# Patient Record
Sex: FEMALE | Race: BLACK OR AFRICAN AMERICAN | NOT HISPANIC OR LATINO | Employment: STUDENT | URBAN - METROPOLITAN AREA
[De-identification: names, ages, dates, MRNs, and addresses within clinical notes are randomized per-mention and may not be internally consistent; named-entity substitution may affect disease eponyms.]

---

## 2017-01-16 ENCOUNTER — PATIENT MESSAGE (OUTPATIENT)
Dept: ENDOCRINOLOGY | Facility: CLINIC | Age: 22
End: 2017-01-16

## 2017-01-16 ENCOUNTER — OFFICE VISIT (OUTPATIENT)
Dept: ENDOCRINOLOGY | Facility: CLINIC | Age: 22
End: 2017-01-16
Payer: COMMERCIAL

## 2017-01-16 VITALS
HEIGHT: 67 IN | HEART RATE: 62 BPM | DIASTOLIC BLOOD PRESSURE: 70 MMHG | BODY MASS INDEX: 22.25 KG/M2 | WEIGHT: 141.75 LBS | SYSTOLIC BLOOD PRESSURE: 110 MMHG

## 2017-01-16 DIAGNOSIS — K59.00 CONSTIPATION, UNSPECIFIED CONSTIPATION TYPE: ICD-10-CM

## 2017-01-16 DIAGNOSIS — E55.9 VITAMIN D DEFICIENCY: ICD-10-CM

## 2017-01-16 DIAGNOSIS — R53.83 FATIGUE, UNSPECIFIED TYPE: Primary | ICD-10-CM

## 2017-01-16 PROCEDURE — 1159F MED LIST DOCD IN RCRD: CPT | Mod: S$GLB,,, | Performed by: INTERNAL MEDICINE

## 2017-01-16 PROCEDURE — 99999 PR PBB SHADOW E&M-EST. PATIENT-LVL III: CPT | Mod: PBBFAC,,, | Performed by: INTERNAL MEDICINE

## 2017-01-16 PROCEDURE — 99204 OFFICE O/P NEW MOD 45 MIN: CPT | Mod: S$GLB,,, | Performed by: INTERNAL MEDICINE

## 2017-01-16 NOTE — PROGRESS NOTES
Subjective:      Patient ID: Charissa Lawson is a 21 y.o. female.    Chief Complaint:  Thyroid Problem      History of Present Illness  Ms. Lawson presents for hormonal evaluation.    Has no significant medical history. Taking no regular medication at this time. Stopped OCP's in 12/2016.    Denies family history of thyroid disease. Has family history of PCOS.  Notes having acne and oily skin. Also has some mild coarse hair on the chin and chest that she plucks. No excessive hair loss or frontal balding.   Menstrual cycles have mostly been regular both on and off of OCP's.     Does endorse increased fatigue. Sleep patterns have been poor. Wakes up frequently at night and unable to go back to sleep.  Has some cold intolerance and constipation. Also notes increased anxiousness and a 10 lb wt loss since 11/2016.    Has bloating and worsening constipation after she eats certain foods.     Review of Systems   Constitutional: Positive for unexpected weight change.   HENT: Negative for voice change.    Eyes: Negative for visual disturbance.   Respiratory: Negative for shortness of breath.    Cardiovascular: Negative for chest pain.   Gastrointestinal: Negative for abdominal pain.   Endocrine: Positive for cold intolerance.   Genitourinary: Negative for frequency.   Musculoskeletal: Negative for myalgias.   Skin: Negative for rash.   Neurological: Positive for headaches.       Objective:   Physical Exam   Constitutional: She is oriented to person, place, and time. She appears well-developed and well-nourished.   HENT:   Head: Normocephalic and atraumatic.   Right Ear: External ear normal.   Left Ear: External ear normal.   Nose: Nose normal.   Neck: No tracheal deviation present. No thyromegaly present.   Cardiovascular: Normal rate, regular rhythm and normal heart sounds.    No edema   Pulmonary/Chest: Effort normal and breath sounds normal.   Abdominal: Soft. Bowel sounds are normal. There is no tenderness.    Musculoskeletal:   Normal gait, no cyanosis or clubbing   Neurological: She is alert and oriented to person, place, and time. She has normal reflexes.   Vibration sense intact   Skin: Skin is warm and dry. No rash noted.   No nodules, no ulcers   Psychiatric: She has a normal mood and affect. Judgment normal.   Vitals reviewed.      Lab Review:   Results for TITO MAYFIELD (MRN [id number]) as of 1/16/2017 12:25   Ref. Range 1/16/2017 09:32   WBC Latest Ref Range: 3.90 - 12.70 K/uL 4.32   RBC Latest Ref Range: 4.00 - 5.40 M/uL 4.29   Hemoglobin Latest Ref Range: 12.0 - 16.0 g/dL 12.5   Hematocrit Latest Ref Range: 37.0 - 48.5 % 37.3   MCV Latest Ref Range: 82 - 98 fL 87   MCH Latest Ref Range: 27.0 - 31.0 pg 29.1   MCHC Latest Ref Range: 32.0 - 36.0 % 33.5   RDW Latest Ref Range: 11.5 - 14.5 % 13.4   Platelets Latest Ref Range: 150 - 350 K/uL 293   MPV Latest Ref Range: 9.2 - 12.9 fL 9.1 (L)   Gran% Latest Ref Range: 38.0 - 73.0 % 36.7 (L)   Gran # Latest Ref Range: 1.8 - 7.7 K/uL 1.6 (L)   Lymph% Latest Ref Range: 18.0 - 48.0 % 52.5 (H)   Lymph # Latest Ref Range: 1.0 - 4.8 K/uL 2.3   Mono% Latest Ref Range: 4.0 - 15.0 % 7.6   Mono # Latest Ref Range: 0.3 - 1.0 K/uL 0.3   Eosinophil% Latest Ref Range: 0.0 - 8.0 % 2.5   Eos # Latest Ref Range: 0.0 - 0.5 K/uL 0.1   Basophil% Latest Ref Range: 0.0 - 1.9 % 0.7   Baso # Latest Ref Range: 0.00 - 0.20 K/uL 0.03     Results for TITO MAYFIELD (MRN [id number]) as of 1/16/2017 12:25   Ref. Range 1/16/2017 09:32   Vit D, 25-Hydroxy Latest Ref Range: 30 - 96 ng/mL 15 (L)   TSH Latest Ref Range: 0.400 - 4.000 uIU/mL 0.945   Estradiol Latest Ref Range: See Text pg/mL 174   FSH Latest Ref Range: See Text mIU/mL 3.30   LH Latest Ref Range: See Text mIU/mL 7.0   Testosterone, Total Latest Ref Range: 5.0 - 73.0 ng/dL 37       Assessment:     1. Fatigue, unspecified type    2. Constipation, unspecified constipation type    3. Vitamin D deficiency        Plan:     --Patient  with non-specific symptoms  --Symptoms do not appear to be from thyroid dysfunction as her TSH level is in the normal range  --TPO antibodies checked and pending (if positive would indicate need for annual TSH check or sooner if symptoms arise)  --Unlikely PCOS as she doesn't have biochemical evidence of hyperandrogenism and cycles are regular  --Unlikely that mild hirsutism and acne are due to PCOS or androgen excess  --LH/FSH and estradiol levels also WNL  --Given GI symptoms and Vit D deficiency will screen for Celiacs disease  --TTG pending  --Has evidence of Vit D deficiency, so recommend she start cholecalciferol 2000 units OTC once daily    Jasbir Saeed M.D. Staff Endocrinology

## 2017-01-16 NOTE — MR AVS SNAPSHOT
[facility] - Endo/Diab/Metab  [address]  [facility] [address]  Phone: [phone]  Fax: [phone]                  Charissa A Renny   2017 8:00 AM   Office Visit    Description:  Female : 1995   Provider:  Jasbir Saeed MD   Department:  [facility] - Endo/Diab/Metab           Reason for Visit     Thyroid Problem           Diagnoses this Visit        Comments    Fatigue, unspecified type    -  Primary     Constipation, unspecified constipation type                To Do List           Future Appointments        Provider Department Dept Phone    2017 9:10 AM LAB, APPOINTMENT [place] [facility] [phone]    2017 10:00 AM Carlos Perez MD [facility] Neurology [phone]      Goals (5 Years of Data)     None      [facility] On [facility]     [facility] Nurse Care Line -  Assistance  Registered nurses in the [facility] provide clinical advisement, health education, appointment booking, and other advisory services.  Call for this free service at [phone].             Medications           Message regarding Medications     Verify the changes and/or additions to your medication regime listed below are the same as discussed with your clinician today.  If any of these changes or additions are incorrect, please notify your healthcare provider.             Verify that the below list of medications is an accurate representation of the medications you are currently taking.  If none reported, the list may be blank. If incorrect, please contact your healthcare provider. Carry this list with you in case of emergency.           Current Medications     ATRALIN 0.05 % gel     norethindrone-ethinyl estradiol (LOESTRIN FE , 28-DAY,) 1 mg-20 mcg (21)/75 mg (7) per tablet Take 1 tablet by mouth once daily.           Clinical Reference Information           Vital Signs - Last Recorded  Most recent update: 2017  8:08 AM by Courtney ALARCON  "ELLIOT Mahoney    BP Pulse Ht Wt LMP BMI    110/70 (45 %/ 69 %)* 62 5' 7" (1.702 m) (86 %, Z= 1.06) 64.3 kg (141 lb 12.1 oz) (72 %, Z= 0.58) 01/08/2017 22.2 kg/m2 (56 %, Z= 0.16)    *BP percentiles are based on NHBPEP's 4th Report    Growth percentiles are based on CDC 2-20 Years data.      Blood Pressure          Most Recent Value    BP  110/70      Allergies as of 1/16/2017     No Known Allergies      Immunizations Administered on Date of Encounter - 1/16/2017     None      Orders Placed During Today's Visit     Future Labs/Procedures Expected by Expires    CBC auto differential  1/16/2017 3/17/2018    Estradiol  1/16/2017 3/17/2018    Follicle stimulating hormone  1/16/2017 3/17/2018    Luteinizing hormone  1/16/2017 3/17/2018    Testosterone  1/16/2017 3/17/2018    THYROID PEROXIDASE ANTIBODY  1/16/2017 3/17/2018    TISSUE TRANSGLUTAMINASE, IGA  1/16/2017 3/17/2018    TSH  1/16/2017 3/17/2018    Vitamin D  1/16/2017 3/17/2018      "

## 2017-02-08 ENCOUNTER — OFFICE VISIT (OUTPATIENT)
Dept: INTERNAL MEDICINE | Facility: CLINIC | Age: 22
End: 2017-02-08
Payer: COMMERCIAL

## 2017-02-08 VITALS
DIASTOLIC BLOOD PRESSURE: 80 MMHG | OXYGEN SATURATION: 99 % | WEIGHT: 143.75 LBS | HEART RATE: 104 BPM | BODY MASS INDEX: 22.56 KG/M2 | HEIGHT: 67 IN | SYSTOLIC BLOOD PRESSURE: 120 MMHG

## 2017-02-08 DIAGNOSIS — Z00.00 HEALTH CARE MAINTENANCE: Primary | ICD-10-CM

## 2017-02-08 DIAGNOSIS — F32.A ANXIETY AND DEPRESSION: ICD-10-CM

## 2017-02-08 DIAGNOSIS — R19.6 BREATH ODOR: ICD-10-CM

## 2017-02-08 DIAGNOSIS — Z23 NEED FOR INFLUENZA VACCINATION: ICD-10-CM

## 2017-02-08 DIAGNOSIS — F41.9 ANXIETY AND DEPRESSION: ICD-10-CM

## 2017-02-08 DIAGNOSIS — Z23 NEED FOR TDAP VACCINATION: ICD-10-CM

## 2017-02-08 PROCEDURE — 99999 PR PBB SHADOW E&M-EST. PATIENT-LVL III: CPT | Mod: PBBFAC,,, | Performed by: INTERNAL MEDICINE

## 2017-02-08 PROCEDURE — 99385 PREV VISIT NEW AGE 18-39: CPT | Mod: S$GLB,,, | Performed by: INTERNAL MEDICINE

## 2017-02-08 RX ORDER — METRONIDAZOLE 500 MG/1
TABLET ORAL
COMMUNITY
Start: 2016-12-26

## 2017-02-08 RX ORDER — ESCITALOPRAM OXALATE 10 MG/1
10 TABLET ORAL DAILY
Qty: 30 TABLET | Refills: 3 | Status: SHIPPED | OUTPATIENT
Start: 2017-02-08 | End: 2017-03-10

## 2017-02-08 RX ORDER — ESCITALOPRAM OXALATE 10 MG/1
10 TABLET ORAL DAILY
Qty: 30 TABLET | Refills: 3 | Status: SHIPPED | OUTPATIENT
Start: 2017-02-08 | End: 2017-02-08 | Stop reason: SDUPTHER

## 2017-02-08 NOTE — PROGRESS NOTES
"Subjective:       Patient ID: Charissa Lawson is a 21 y.o. female.    Chief Complaint: Establish Care    HPI   20 yo F here to establish care and have yearly preventative healthcare visit.     She reports that she has a foul odor from her breath. Not from vagina nor armpits.   Going on x 4 years.  She reports that her parents don't smell it but that others sometimes do. When I ask her about others smelling the odor she reports that people will not say something to her but put their hands over their noses. She smells a fishy odor. She reports often after getting out of the shower.   She is worried about TMAU. Trimethylaminuria. She has read on the internet that this can lead to fish odor.   She reports good hygiene.     She saw endocrine on 1/16/17 for fatigue. TFT normal, unlikely PCOS, LH/FSH, estardiol nl. Screen for celiac w TTD was normal. Start vit d 2000 IU OTC.     Pt message:  "Urine testing for a disorder called Trimethylaminuria   (TMAU). This test is only done in a few places in the    and will most likely have to be shipped to [facility]."    She has hx gonorrhea treated 12/15/16.   Dental eval has been okay.     Review of Systems   Constitutional: Negative for fever.   HENT: Negative.         Reports foul breath and body odor   Eyes: Negative.    Respiratory: Negative for shortness of breath.    Cardiovascular: Negative for chest pain and leg swelling.   Gastrointestinal: Negative for abdominal pain, diarrhea, nausea and vomiting.   Genitourinary: Negative.    Musculoskeletal: Negative for arthralgias.   Skin: Negative for rash.   Psychiatric/Behavioral: The patient is nervous/anxious.         Frequently feels anxious, feels judged by others       Objective:     Visit Vitals    /80    Pulse 104    Ht 5' 7" (1.702 m)    Wt 65.2 kg (143 lb 11.8 oz)    LMP 01/08/2017    SpO2 99%    BMI 22.51 kg/m2        Physical Exam   Constitutional: She is oriented to person, place, and " time. She appears well-developed and well-nourished.   I do not detect a foul breath or body odor.    HENT:   Head: Normocephalic and atraumatic.   Eyes: Conjunctivae and EOM are normal. Pupils are equal, round, and reactive to light.   Neck: Neck supple. No thyromegaly present.   Cardiovascular: Normal rate, regular rhythm and normal heart sounds.    No murmur heard.  Pulmonary/Chest: Effort normal and breath sounds normal. No respiratory distress. She has no wheezes.   Abdominal: Soft. Bowel sounds are normal. She exhibits no distension. There is no tenderness.   Musculoskeletal: Normal range of motion.   Neurological: She is alert and oriented to person, place, and time.   Skin: Skin is warm and dry. No rash noted.   Psychiatric: She has a normal mood and affect. Judgment and thought content normal.   Vitals reviewed.      Assessment:       1. Health care maintenance    2. Breath odor    3. Anxiety and depression    4. Need for Tdap vaccination    5. Need for influenza vaccination        Plan:       Charissa KHOURY was seen today for establish care.    Diagnoses and all orders for this visit:    Health care maintenance  Patient given written rx to receive tdap and flu vaccine at pharmacy    Breath odor - per pt report. Pt is insistent that she wants to be tested for TMAU. Apart from subjective fishy odor I do not recognize any other symptoms of IEM on her history or physical I suspect that her concern over her breath/body odor may stem from anxiety rather than an organic issue especially as her parents notice no odor and I do not detect any currently. She would like further evaluation so I will refer her to genetics for evaluation for possible testing for TMAU.  -     Ambulatory consult to Genetics    Anxiety and depression  -   start  escitalopram oxalate (LEXAPRO) 10 MG tablet; Take 1 tablet (10 mg total) by mouth once daily.    Need for Tdap vaccination  Need for influenza vaccination

## 2017-02-08 NOTE — MR AVS SNAPSHOT
[facility] - Internal Medicine  [address]  [place] [address]  Phone: [phone]  Fax: [phone]                  Charissa Lawson   2017 2:00 PM   Office Visit    Description:  Female : 1995   Provider:  Surekha Duffy MD   Department:  [facility] - Internal Medicine           Reason for Visit     Establish Care           Diagnoses this Visit        Comments    Health care maintenance    -  Primary     Breath odor         Anxiety and depression         Need for Tdap vaccination         Need for influenza vaccination                To Do List           Future Appointments        Provider Department Dept Phone    2017 8:00 AM Surekha Duffy MD [facility] Internal Medicine [phone]      Goals (5 Years of Data)     None      Follow-Up and Disposition     Return in about 2 months (around 2017).       These Medications        Disp Refills Start End    escitalopram oxalate (LEXAPRO) 10 MG tablet 30 tablet 3 2017 3/10/2017    Take 1 tablet (10 mg total) by mouth once daily. - Oral    [facility] [address] [place] [address] AT [facility] #: [phone]         [facility] On Call     [facility] On Call Nurse Care Line -  Assistance  Registered nurses in the [facility] provide clinical advisement, health education, appointment booking, and other advisory services.  Call for this free service at [phone].             Medications           Message regarding Medications     Verify the changes and/or additions to your medication regime listed below are the same as discussed with your clinician today.  If any of these changes or additions are incorrect, please notify your healthcare provider.        START taking these NEW medications        Refills    escitalopram oxalate (LEXAPRO) 10 MG tablet 3    Sig: Take 1 tablet (10 mg total) by mouth once daily.    Class: Normal    Route: Oral  "          Verify that the below list of medications is an accurate representation of the medications you are currently taking.  If none reported, the list may be blank. If incorrect, please contact your healthcare provider. Carry this list with you in case of emergency.           Current Medications     ATRALIN 0.05 % gel     escitalopram oxalate (LEXAPRO) 10 MG tablet Take 1 tablet (10 mg total) by mouth once daily.    metronidazole (FLAGYL) 500 MG tablet     norethindrone-ethinyl estradiol (LOESTRIN FE 1/20, 28-DAY,) 1 mg-20 mcg (21)/75 mg (7) per tablet Take 1 tablet by mouth once daily.           Clinical Reference Information           Your Vitals Were     BP Pulse Height Weight Last Period SpO2    120/80 104 5' 7" (1.702 m) 65.2 kg (143 lb 11.8 oz) 01/08/2017 99%    BMI                22.51 kg/m2          Blood Pressure          Most Recent Value    BP  120/80      Allergies as of 2/8/2017     No Known Allergies      Immunizations Administered on Date of Encounter - 2/8/2017     None      Orders Placed During Today's Visit      Normal Orders This Visit    Ambulatory consult to Genetics       Language Assistance Services     ATTENTION: Language assistance services are available, free of charge. Please call [phone].      ATENCIÓN: Si makalyala gabino, tiene a kaiser disposición servicios gratuitos de asistencia lingüística. Llame al [phone].     RALPH Ý: N?u b?n nói Ti?ng Vi?t, có các d?ch v? h? tr? ngôn ng? mi?n phí dành cho b?n. G?i s? [phone].         [facility] complies with applicable Federal civil rights laws and does not discriminate on the basis of race, color, national origin, age, disability, or sex.        "

## 2017-02-20 ENCOUNTER — OFFICE VISIT (OUTPATIENT)
Dept: OBSTETRICS AND GYNECOLOGY | Facility: CLINIC | Age: 22
End: 2017-02-20
Attending: OBSTETRICS & GYNECOLOGY
Payer: COMMERCIAL

## 2017-02-20 VITALS
DIASTOLIC BLOOD PRESSURE: 70 MMHG | SYSTOLIC BLOOD PRESSURE: 108 MMHG | WEIGHT: 138.25 LBS | HEIGHT: 67 IN | BODY MASS INDEX: 21.7 KG/M2

## 2017-02-20 DIAGNOSIS — R10.2 PELVIC PAIN IN FEMALE: Primary | ICD-10-CM

## 2017-02-20 PROCEDURE — 99214 OFFICE O/P EST MOD 30 MIN: CPT | Mod: S$GLB,,, | Performed by: OBSTETRICS & GYNECOLOGY

## 2017-02-20 PROCEDURE — 99999 PR PBB SHADOW E&M-EST. PATIENT-LVL II: CPT | Mod: PBBFAC,,, | Performed by: OBSTETRICS & GYNECOLOGY

## 2017-02-20 RX ORDER — DOXYCYCLINE 100 MG/1
CAPSULE ORAL
Refills: 0 | COMMUNITY
Start: 2017-02-16

## 2017-02-20 NOTE — PROGRESS NOTES
Subjective:       Patient ID: Charissa Lawson is a 21 y.o. female.    Chief Complaint:  Pelvic Pain (Patient states that she was treated for Gonorrhea 2016. Patient states that she was treated with Rocephin and Azithromycin in December and on last week.Also reports that STD test for CT/GC was negative  on last week. )      History of Present Illness  HPI  Pt is 21 y.o. with Patient's last menstrual period was 2017 (approximate). here in follow up for pelvic pain.  Pt was dx with GC 2016.  Was having pain at that time.  AFter abx, pain went away.  Now, she has been having pain for last 3 weeks.  No discharge.  She is back with her former partner.  No dysuria.  Also having low grade fevers.  No diarrhea.  Of note, sister has mono (lives with her).    Was previoulsy on ocp's.  Off since 2016 b/c they were impacting her mood.      Pt most worried about having PID.  Went to urgent care last week and was tested for STD's again.  All negative.    GYN & OB History  Patient's last menstrual period was 2017 (approximate).   Date of Last Pap: No result found    OB History    Para Term  AB SAB TAB Ectopic Multiple Living   0 0 0 0 0 0 0 0 0 0             Review of Systems  Review of Systems   Constitutional: Negative for activity change, appetite change and fatigue.   HENT: Negative.  Negative for tinnitus.    Eyes: Negative.    Respiratory: Negative for cough and shortness of breath.    Cardiovascular: Negative for chest pain and palpitations.   Gastrointestinal: Negative.  Negative for abdominal pain, blood in stool, constipation, diarrhea and nausea.   Endocrine: Negative.  Negative for hot flashes.   Genitourinary: Positive for pelvic pain. Negative for dyspareunia, dysuria, frequency, menstrual problem, vaginal discharge, dysmenorrhea, urinary incontinence and postcoital bleeding.   Musculoskeletal: Negative for back pain and joint swelling.   Skin:  Negative for rash.    Neurological: Negative.  Negative for headaches.   Hematological: Negative.  Does not bruise/bleed easily.   Psychiatric/Behavioral: The patient is not nervous/anxious.    Breast: negative.  Negative for breast mass, nipple discharge and skin changes          Objective:    Physical Exam:   Constitutional: She is oriented to person, place, and time. She appears well-developed and well-nourished. No distress.    HENT:   Head: Normocephalic and atraumatic.    Eyes: Conjunctivae and lids are normal. Pupils are equal, round, and reactive to light.    Neck: Normal range of motion and full passive range of motion without pain. Neck supple.    Cardiovascular: Normal rate and regular rhythm.  Exam reveals no edema.     Pulmonary/Chest: Effort normal and breath sounds normal. She has no wheezes.        Abdominal: Soft. Normal appearance and bowel sounds are normal. She exhibits no distension. There is no tenderness. There is no rebound and no guarding.     Genitourinary: Vagina normal and uterus normal. Pelvic exam was performed with patient supine. There is no tenderness or lesion on the right labia. There is no tenderness or lesion on the left labia. Uterus is not deviated, not fixed and not hosting fibroids. Cervix is normal. Right adnexum displays no mass and no tenderness. Left adnexum displays no mass and no tenderness. No rectocele, cystocele or unspecified prolapse of vaginal walls in the vagina. No vaginal discharge found. Labial bartholins normal.Cervix exhibits no motion tenderness and no discharge.   Genitourinary Comments: Difficult exam b/c pt was guarding and not tolerant of speculum exam (was lifting her bottom after speculum inserted 1cm).  BME limited by tightening of abdominal wall.  No d/c from cervix           Musculoskeletal: Normal range of motion and moves all extremeties.       Neurological: She is alert and oriented to person, place, and time. She has normal strength.    Skin: Skin is warm and dry.     Psychiatric: She has a normal mood and affect. Her speech is normal and behavior is normal. Thought content normal. Her mood appears not anxious. She does not exhibit a depressed mood. She expresses no suicidal plans and no homicidal plans.          Assessment:        1. Pelvic pain in female                Plan:      Charissa KHOURY was seen today for pelvic pain.    Diagnoses and all orders for this visit:    Pelvic pain in female  -     US Pelvis Complete Non OB; Future  Had a long discussion with pt about different etiologies of her pain.  No mucopurulent d/c.  So low prob for PID.  No dysuria and most recent urine culture was negative.  Should be starting her cycle soon, so could be some component of dysmenorrhea.   U/s will help confirm that there is not an ovarian cyst  Aware that PID cannot be seen on u/s.  I spent approx 40 min with pt.  Reassured her of likely normal future fertility issues.  Importance of safe sex and using condoms to prevent future STI's.

## 2017-04-13 ENCOUNTER — TELEPHONE (OUTPATIENT)
Dept: GENETICS | Facility: CLINIC | Age: 22
End: 2017-04-13

## 2017-04-13 NOTE — TELEPHONE ENCOUNTER
Spoke with pt and informed her that Dr. Hua does not specialize in the metabolic disorder that she has and that he recommends [facility]. Pt verbalized understanding. Appt cancelled.

## 2017-12-22 ENCOUNTER — TELEPHONE (OUTPATIENT)
Dept: GENETICS | Facility: CLINIC | Age: 22
End: 2017-12-22

## 2017-12-22 NOTE — TELEPHONE ENCOUNTER
Spoke with pt and informed her that a referral would have to come from the provider that originally referred her to genetics. Pt verbalized understanding.

## 2017-12-22 NOTE — TELEPHONE ENCOUNTER
----- Message from Aracelis Valentin sent at 12/22/2017  4:07 PM CST -----  Contact: Pt  Pt is requesting to speak with a nurse regarding a referral to the [facility].      Pt can be reached at [phone].    Thank you

## 2021-02-10 ENCOUNTER — TELEPHONE (OUTPATIENT)
Dept: OBSTETRICS AND GYNECOLOGY | Facility: CLINIC | Age: 26
End: 2021-02-10

## 2022-03-15 ENCOUNTER — OFFICE VISIT (OUTPATIENT)
Dept: URGENT CARE | Facility: CLINIC | Age: 27
End: 2022-03-15

## 2022-03-15 VITALS
DIASTOLIC BLOOD PRESSURE: 77 MMHG | SYSTOLIC BLOOD PRESSURE: 115 MMHG | RESPIRATION RATE: 18 BRPM | HEART RATE: 120 BPM | BODY MASS INDEX: 20.4 KG/M2 | TEMPERATURE: 98 F | OXYGEN SATURATION: 96 % | WEIGHT: 130 LBS | HEIGHT: 67 IN

## 2022-03-15 DIAGNOSIS — J03.90 EXUDATIVE TONSILLITIS: Primary | ICD-10-CM

## 2022-03-15 DIAGNOSIS — Z16.20 THERAPY FAILURE DUE TO ANTIBIOTIC RESISTANCE: ICD-10-CM

## 2022-03-15 LAB
CTP QC/QA: YES
MOLECULAR STREP A: NEGATIVE

## 2022-03-15 PROCEDURE — 99203 OFFICE O/P NEW LOW 30 MIN: CPT | Mod: TIER,S$GLB,, | Performed by: NURSE PRACTITIONER

## 2022-03-15 PROCEDURE — 87651 POCT STREP A MOLECULAR: ICD-10-PCS | Mod: QW,TIER,S$GLB, | Performed by: NURSE PRACTITIONER

## 2022-03-15 PROCEDURE — 99203 PR OFFICE/OUTPT VISIT, NEW, LEVL III, 30-44 MIN: ICD-10-PCS | Mod: TIER,S$GLB,, | Performed by: NURSE PRACTITIONER

## 2022-03-15 PROCEDURE — 87651 STREP A DNA AMP PROBE: CPT | Mod: QW,TIER,S$GLB, | Performed by: NURSE PRACTITIONER

## 2022-03-15 RX ORDER — IBUPROFEN 600 MG/1
600 TABLET ORAL EVERY 6 HOURS PRN
Qty: 30 TABLET | Refills: 0 | Status: SHIPPED | OUTPATIENT
Start: 2022-03-15

## 2022-03-15 RX ORDER — PREDNISONE 20 MG/1
40 TABLET ORAL DAILY
Qty: 6 TABLET | Refills: 0 | Status: SHIPPED | OUTPATIENT
Start: 2022-03-15 | End: 2022-03-18

## 2022-03-15 RX ORDER — AMOXICILLIN 500 MG/1
500 TABLET, FILM COATED ORAL EVERY 12 HOURS
Qty: 20 TABLET | Refills: 0 | Status: SHIPPED | OUTPATIENT
Start: 2022-03-15 | End: 2022-03-25

## 2022-03-15 NOTE — PATIENT INSTRUCTIONS
Take full course of antibiotics    Change to fresh 24 hours after starting antibiotic    Gargle with Magic mouthwash every 4 hours as needed for sore throat or use of water gargles.    Take prednisone 40 mg daily x3 days    Over the counter you can use Tylenol (acetominophen) or Ibuprofen for your minor aches and pains as long as you have no contraindications.    Good nutrition. Lots of rest. Plenty of fluids     You must understand that you've received an Urgent Care treatment only and that you may be released before all your medical problems are known or treated. You, the patient, will arrange for follow up care as instructed.  Follow up with your PCP or specialty clinic as directed in the next 1-2 weeks if not improved or as needed.  You can call [phone] to schedule an appointment with the appropriate provider.  If your condition worsens we recommend that you receive another evaluation at the emergency room immediately or contact your primary medical clinics after hours call service to discuss your concerns.  Please return here or go to the Emergency Department for any concerns or worsening of condition.

## 2022-03-15 NOTE — LETTER
March 15, 2022      Urgent Care [address] [address]  Phone: [phone]  Fax: [phone]       Patient: Charissa Lawson   YOB: 1995  Date of Visit: 03/15/2022    To Whom It May Concern:    Julia Lawson  was at [facility] on 03/15/2022. The patient may return to work/school on 3/16/22 with no restrictions. If you have any questions or concerns, or if I can be of further assistance, please do not hesitate to contact me.    Sincerely,    Kenyatta Farley NP

## 2022-03-16 ENCOUNTER — TELEPHONE (OUTPATIENT)
Dept: URGENT CARE | Facility: CLINIC | Age: 27
End: 2022-03-16

## 2022-03-16 NOTE — TELEPHONE ENCOUNTER
made courtesy call to patient to see how she was feeling. Pt reports she is feeling much better than yesterday and her throat is clearing up.  Educated patient take full course of antibiotics